# Patient Record
(demographics unavailable — no encounter records)

---

## 2025-03-12 NOTE — PHYSICAL EXAM
[de-identified] : 41 year old female  presents to office for cosmetic consultation.  Pt would like to achieve a rejuvenated appearance and reduce facial wrinkles. Pt has had neurotoxin treatment in the past.   Procedure:  Patients name and date of birth confirmed. Pt skin was sterilized prepped and marked. Photo documentation and consent obtained.     Pt received 28 Units @ 4u dilution.  Treatment area: glabella, frontalis and crows   Neurotoxin:  Lot Number:  Expiration Date:    Pt tolerated procedure well with no complications.    Post- treatment:  Pt education includes no rubbing or touch injection sites. No tight fitting hats or headbands, No sweating or excessive exercise for 24 hours.  Pt can return to office for touch-up within 2 weeks and Pt will f/u in 3-4 months for cosmetic follow-up.